# Patient Record
Sex: MALE | Race: WHITE | NOT HISPANIC OR LATINO | Employment: FULL TIME | URBAN - METROPOLITAN AREA
[De-identification: names, ages, dates, MRNs, and addresses within clinical notes are randomized per-mention and may not be internally consistent; named-entity substitution may affect disease eponyms.]

---

## 2022-11-28 ENCOUNTER — OCCUPATIONAL HEALTH (OUTPATIENT)
Dept: URGENT CARE | Facility: CLINIC | Age: 37
End: 2022-11-28

## 2022-11-28 DIAGNOSIS — Z02.1 PRE-EMPLOYMENT EXAMINATION: Primary | ICD-10-CM

## 2022-11-28 PROCEDURE — 72100 X-RAY EXAM L-S SPINE 2/3 VWS: CPT | Mod: TIER,S$GLB,, | Performed by: RADIOLOGY

## 2022-11-28 PROCEDURE — 99499 PHYSICAL, BASIC COMPLEXITY: ICD-10-PCS | Mod: S$GLB,,, | Performed by: NURSE PRACTITIONER

## 2022-11-28 PROCEDURE — 99499 UNLISTED E&M SERVICE: CPT | Mod: S$GLB,,, | Performed by: NURSE PRACTITIONER

## 2022-11-28 PROCEDURE — 72100 XR LUMBAR SPINE 2 OR 3 VIEWS: ICD-10-PCS | Mod: TIER,S$GLB,, | Performed by: RADIOLOGY

## 2024-09-10 ENCOUNTER — HOSPITAL ENCOUNTER (EMERGENCY)
Facility: HOSPITAL | Age: 39
Discharge: ANOTHER HOSPITAL | End: 2024-09-10
Attending: SURGERY

## 2024-09-10 VITALS
DIASTOLIC BLOOD PRESSURE: 75 MMHG | HEIGHT: 65 IN | SYSTOLIC BLOOD PRESSURE: 100 MMHG | WEIGHT: 166.56 LBS | BODY MASS INDEX: 27.75 KG/M2 | HEART RATE: 71 BPM | TEMPERATURE: 99 F | OXYGEN SATURATION: 98 % | RESPIRATION RATE: 18 BRPM

## 2024-09-10 DIAGNOSIS — L03.90 CELLULITIS, UNSPECIFIED CELLULITIS SITE: Primary | ICD-10-CM

## 2024-09-10 DIAGNOSIS — Z87.81 STATUS POST ORIF OF FRACTURE OF ANKLE: ICD-10-CM

## 2024-09-10 DIAGNOSIS — Z98.890 STATUS POST ORIF OF FRACTURE OF ANKLE: ICD-10-CM

## 2024-09-10 PROBLEM — L02.416: Status: ACTIVE | Noted: 2024-09-10

## 2024-09-10 PROBLEM — Z96.9 PRESENCE OF RETAINED HARDWARE: Status: ACTIVE | Noted: 2024-09-10

## 2024-09-10 PROBLEM — F19.10 POLYSUBSTANCE ABUSE: Status: ACTIVE | Noted: 2024-09-10

## 2024-09-10 PROBLEM — L03.116 CELLULITIS OF LEFT ANKLE: Status: ACTIVE | Noted: 2024-09-10

## 2024-09-10 PROBLEM — F10.10 ETOH ABUSE: Status: ACTIVE | Noted: 2024-09-10

## 2024-09-10 LAB
ALBUMIN SERPL BCP-MCNC: 4.1 G/DL (ref 3.5–5.2)
ALP SERPL-CCNC: 62 U/L (ref 55–135)
ALT SERPL W/O P-5'-P-CCNC: 14 U/L (ref 10–44)
ANION GAP SERPL CALC-SCNC: 10 MMOL/L (ref 8–16)
AST SERPL-CCNC: 20 U/L (ref 10–40)
BASOPHILS # BLD AUTO: 0.03 K/UL (ref 0–0.2)
BASOPHILS NFR BLD: 0.3 % (ref 0–1.9)
BILIRUB SERPL-MCNC: 0.6 MG/DL (ref 0.1–1)
BUN SERPL-MCNC: 17 MG/DL (ref 6–20)
CALCIUM SERPL-MCNC: 9.8 MG/DL (ref 8.7–10.5)
CHLORIDE SERPL-SCNC: 103 MMOL/L (ref 95–110)
CO2 SERPL-SCNC: 23 MMOL/L (ref 23–29)
CREAT SERPL-MCNC: 1 MG/DL (ref 0.5–1.4)
CRP SERPL-MCNC: 20.2 MG/L (ref 0–8.2)
DIFFERENTIAL METHOD BLD: ABNORMAL
EOSINOPHIL # BLD AUTO: 0.1 K/UL (ref 0–0.5)
EOSINOPHIL NFR BLD: 1.1 % (ref 0–8)
ERYTHROCYTE [DISTWIDTH] IN BLOOD BY AUTOMATED COUNT: 14.2 % (ref 11.5–14.5)
EST. GFR  (NO RACE VARIABLE): >60 ML/MIN/1.73 M^2
GLUCOSE SERPL-MCNC: 99 MG/DL (ref 70–110)
HCT VFR BLD AUTO: 40.8 % (ref 40–54)
HGB BLD-MCNC: 14.4 G/DL (ref 14–18)
IMM GRANULOCYTES # BLD AUTO: 0.04 K/UL (ref 0–0.04)
IMM GRANULOCYTES NFR BLD AUTO: 0.4 % (ref 0–0.5)
LYMPHOCYTES # BLD AUTO: 2.3 K/UL (ref 1–4.8)
LYMPHOCYTES NFR BLD: 20.3 % (ref 18–48)
MCH RBC QN AUTO: 29.6 PG (ref 27–31)
MCHC RBC AUTO-ENTMCNC: 35.3 G/DL (ref 32–36)
MCV RBC AUTO: 84 FL (ref 82–98)
MONOCYTES # BLD AUTO: 1.5 K/UL (ref 0.3–1)
MONOCYTES NFR BLD: 13.4 % (ref 4–15)
NEUTROPHILS # BLD AUTO: 7.4 K/UL (ref 1.8–7.7)
NEUTROPHILS NFR BLD: 64.5 % (ref 38–73)
NRBC BLD-RTO: 0 /100 WBC
PLATELET # BLD AUTO: 302 K/UL (ref 150–450)
PMV BLD AUTO: 8.4 FL (ref 9.2–12.9)
POTASSIUM SERPL-SCNC: 4.4 MMOL/L (ref 3.5–5.1)
PROT SERPL-MCNC: 7.6 G/DL (ref 6–8.4)
RBC # BLD AUTO: 4.86 M/UL (ref 4.6–6.2)
SODIUM SERPL-SCNC: 136 MMOL/L (ref 136–145)
WBC # BLD AUTO: 11.39 K/UL (ref 3.9–12.7)

## 2024-09-10 PROCEDURE — 85025 COMPLETE CBC W/AUTO DIFF WBC: CPT

## 2024-09-10 PROCEDURE — 25500020 PHARM REV CODE 255

## 2024-09-10 PROCEDURE — 86140 C-REACTIVE PROTEIN: CPT

## 2024-09-10 PROCEDURE — 25000003 PHARM REV CODE 250

## 2024-09-10 PROCEDURE — 96372 THER/PROPH/DIAG INJ SC/IM: CPT

## 2024-09-10 PROCEDURE — 99285 EMERGENCY DEPT VISIT HI MDM: CPT | Mod: 25

## 2024-09-10 PROCEDURE — 63600175 PHARM REV CODE 636 W HCPCS

## 2024-09-10 PROCEDURE — 80053 COMPREHEN METABOLIC PANEL: CPT

## 2024-09-10 RX ORDER — KETOROLAC TROMETHAMINE 30 MG/ML
30 INJECTION, SOLUTION INTRAMUSCULAR; INTRAVENOUS
Status: COMPLETED | OUTPATIENT
Start: 2024-09-10 | End: 2024-09-10

## 2024-09-10 RX ORDER — HYDROCODONE BITARTRATE AND ACETAMINOPHEN 5; 325 MG/1; MG/1
1 TABLET ORAL
Status: COMPLETED | OUTPATIENT
Start: 2024-09-10 | End: 2024-09-10

## 2024-09-10 RX ORDER — VANCOMYCIN 2 GRAM/500 ML IN 0.9 % SODIUM CHLORIDE INTRAVENOUS
2000
Status: DISCONTINUED | OUTPATIENT
Start: 2024-09-10 | End: 2024-09-10 | Stop reason: HOSPADM

## 2024-09-10 RX ADMIN — Medication 2000 MG: at 11:09

## 2024-09-10 RX ADMIN — HYDROCODONE BITARTRATE AND ACETAMINOPHEN 1 TABLET: 5; 325 TABLET ORAL at 06:09

## 2024-09-10 RX ADMIN — IOHEXOL 75 ML: 350 INJECTION, SOLUTION INTRAVENOUS at 07:09

## 2024-09-10 RX ADMIN — KETOROLAC TROMETHAMINE 30 MG: 30 INJECTION, SOLUTION INTRAMUSCULAR; INTRAVENOUS at 06:09

## 2024-09-10 NOTE — PROGRESS NOTES
"Pharmacokinetic Initial Assessment: IV Vancomycin    Assessment/Plan:    Initiate intravenous vancomycin with loading dose of 2000 mg once followed by a maintenance dose of vancomycin 1000mg IV every 12 hours  Desired empiric serum trough concentration is 10 to 15 mcg/mL  Draw vancomycin trough level 60 min prior to fourth dose on 9/11 at approximately 2200  Pharmacy will continue to follow and monitor vancomycin.      Please contact pharmacy at extension [phone] with any questions regarding this assessment.     Thank you for the consult,   Ale Sumner       Patient brief summary:  Peter Car is a 38 y.o. male initiated on antimicrobial therapy with IV Vancomycin for treatment of suspected skin & soft tissue infection    Drug Allergies:   Review of patient's allergies indicates:   Allergen Reactions    Wasp venom        Actual Body Weight:   75.5kg    Renal Function:   Estimated Creatinine Clearance: 93.2 mL/min (based on SCr of 1 mg/dL).,     Dialysis Method (if applicable):  N/A    CBC (last 72 hours):  Recent Labs   Lab Result Units 09/10/24  0631   WBC K/uL 11.39   Hemoglobin g/dL 14.4   Hematocrit % 40.8   Platelets K/uL 302   Gran % % 64.5   Lymph % % 20.3   Mono % % 13.4   Eosinophil % % 1.1   Basophil % % 0.3   Differential Method  Automated       Metabolic Panel (last 72 hours):  Recent Labs   Lab Result Units 09/10/24  0631   Sodium mmol/L 136   Potassium mmol/L 4.4   Chloride mmol/L 103   CO2 mmol/L 23   Glucose mg/dL 99   BUN mg/dL 17   Creatinine mg/dL 1.0   Albumin g/dL 4.1   Total Bilirubin mg/dL 0.6   Alkaline Phosphatase U/L 62   AST U/L 20   ALT U/L 14       Drug levels (last 3 results):  No results for input(s): "VANCOMYCINRA", "VANCORANDOM", "VANCOMYCINPE", "VANCOPEAK", "VANCOMYCINTR", "VANCOTROUGH" in the last 72 hours.    Microbiologic Results:  Microbiology Results (last 7 days)       ** No results found for the last 168 hours. **            "

## 2024-09-10 NOTE — ED PROVIDER NOTES
"Encounter Date: 9/10/2024    Source of History:   Patient and medical record, without language barrier or      Chief complaint:  Ankle Pain (Patient reports getting off of work yesterday and noticed increased swelling and redness to left ankle.  Denies injury or trauma.  No fever.  No drainage from site.)    HPI:  Peter Car is a 38 y.o. male with no significant medical history presenting with chief complaint of left ankle pain and swelling.  Patient states that after getting off of work yesterday he noticed swelling, redness, pain to the medial malleolus of his left ankle.  He denies any trauma but has a small scab over the medial malleolus which he states is from his work boots.  He denies any trauma or fevers at this time.  Patient states he has been unable to bear weight due to the pain.  He had an ORIF done on this ankle for a fracture when he was 13 years old.    This is the extent to the patients complaints today here in the emergency department.    ROS: A review of systems was conducted with pertinent positive and negative findings documented in HPI with all other systems reviewed and negative.  ROS    Review of patient's allergies indicates:   Allergen Reactions    Wasp venom        PMH:  As per HPI and below:  History reviewed. No pertinent past medical history.  Past Surgical History:   Procedure Laterality Date    ANKLE SURGERY Left      Social History     Socioeconomic History    Marital status: Single   Tobacco Use    Smoking status: Every Day     Current packs/day: 1.00     Types: Cigarettes     Passive exposure: Past    Smokeless tobacco: Never    Tobacco comments:     Quit smoking 5 days ago.   Substance and Sexual Activity    Alcohol use: Yes     Alcohol/week: 12.0 standard drinks of alcohol     Types: 12 Cans of beer per week     Vitals:    /72   Pulse 77   Temp 98.5 °F (36.9 °C) (Oral)   Resp 18   Ht 5' 5" (1.651 m)   Wt 75.5 kg (166 lb 8.9 oz)   SpO2 99%   BMI 27.72 " kg/m²     Physical Exam  Vitals and nursing note reviewed.   Constitutional:       General: He is not in acute distress.     Appearance: Normal appearance. He is not toxic-appearing or diaphoretic.   HENT:      Head: Normocephalic and atraumatic.      Right Ear: External ear normal.      Left Ear: External ear normal.   Eyes:      General: No scleral icterus.     Conjunctiva/sclera: Conjunctivae normal.   Cardiovascular:      Rate and Rhythm: Normal rate and regular rhythm.   Pulmonary:      Effort: Pulmonary effort is normal. No respiratory distress.      Breath sounds: No stridor.   Abdominal:      General: Abdomen is flat. There is no distension.   Musculoskeletal:         General: No swelling.      Cervical back: Normal range of motion and neck supple.      Comments:    LLE:  No deformity, small abrasion/scab over medial malleolus  Swelling, redness, tenderness to palpation over medial malleolus  Compartments soft  Pain with passive range of motion  SILT   Motor intact   2+ DP/PT      Skin:     General: Skin is dry.      Coloration: Skin is not jaundiced.   Neurological:      Mental Status: He is alert and oriented to person, place, and time. Mental status is at baseline.   Psychiatric:         Mood and Affect: Mood normal.         Behavior: Behavior normal.       Procedures    Laboratory Studies:  Labs that have been ordered have been independently reviewed and interpreted by myself.  Labs Reviewed   CBC W/ AUTO DIFFERENTIAL - Abnormal       Result Value    WBC 11.39      RBC 4.86      Hemoglobin 14.4      Hematocrit 40.8      MCV 84      MCH 29.6      MCHC 35.3      RDW 14.2      Platelets 302      MPV 8.4 (*)     Immature Granulocytes 0.4      Gran # (ANC) 7.4      Immature Grans (Abs) 0.04      Lymph # 2.3      Mono # 1.5 (*)     Eos # 0.1      Baso # 0.03      nRBC 0      Gran % 64.5      Lymph % 20.3      Mono % 13.4      Eosinophil % 1.1      Basophil % 0.3      Differential Method Automated      COMPREHENSIVE METABOLIC PANEL    Sodium 136      Potassium 4.4      Chloride 103      CO2 23      Glucose 99      BUN 17      Creatinine 1.0      Calcium 9.8      Total Protein 7.6      Albumin 4.1      Total Bilirubin 0.6      Alkaline Phosphatase 62      AST 20      ALT 14      eGFR >60      Anion Gap 10     C-REACTIVE PROTEIN     Imaging Results              CT Ankle (Including Hindfoot) With Contrast Left (Final result)  Result time 09/10/24 08:33:20      Final result by Broderick Camacho MD (09/10/24 08:33:20)                   Impression:      Status post ORIF of the left ankle.  Severe medial malleolar soft tissue swelling likely representing cellulitis, with accompanying phlegmon/early abscess formation.  No evidence for osteomyelitis.  MRI would be more sensitive for osteomyelitis if clinically warranted.      Electronically signed by: Broderick Camacho MD  Date:    09/10/2024  Time:    08:33               Narrative:    EXAMINATION:  CT ANKLE (INCLUDING HINDFOOT) WITH CONTRAST LEFT    CLINICAL HISTORY:  Soft tissue infection suspected, ankle, xray done;left medial malleolus cellulitis with underlying hardware, eval for osteo and abscess;    TECHNIQUE:  Axial images through the left ankle were acquired following intravenous administration of 75 cc Omnipaque 350.  Multiplanar reformatted images were created    COMPARISON:  None    FINDINGS:  There has been ORIF of the left ankle with a lateral plate screw device and 3 screws of the medial malleolus with accompanying cerclage wire.  Hardware components appear intact and engaged with good alignment present.  There is no acute fracture or dislocation.  There are no osseous erosive changes.    There is severe subcutaneous fat stranding and fluid density centered along the medial aspect of the ankle.  A poorly formed fluid collection is present over the medial aspect of the medial malleolus, measuring 2.3 x 2.1 x 0.7 cm.  There is no soft tissue gas.  There  is no tibiotalar joint effusion.                                       X-Ray Ankle Complete Left (Final result)  Result time 09/10/24 08:28:11      Final result by Broderick Camacho MD (09/10/24 08:28:11)                   Impression:      Status post ORIF the left ankle with intact hardware in good alignment.  Severe medial soft tissue swelling.      Electronically signed by: Broderick Camacho MD  Date:    09/10/2024  Time:    08:28               Narrative:    EXAMINATION:  XR ANKLE COMPLETE 3 VIEW LEFT    CLINICAL HISTORY:  Exposure to other specified factors, initial encounter    TECHNIQUE:  AP, lateral and oblique views of the left ankle were performed.    COMPARISON:  None    FINDINGS:  There has been ORIF of the left ankle with a lateral fibular plate screw device as well as 3 screws of the medial tibia with accompanying cerclage wire.  The hardware components are intact and engaged with good alignment present there is no acute fracture.  There is severe medial soft tissue swelling.  No osseous erosive changes.                                    Imaging (independently interpreted by me): Left Ankle X-Ray:  ORIF, no fractures or dislocations    I decided to obtain the patient's medical records.  Summary of Medical Records:    Medications   vancomycin 2 g in 0.9% sodium chloride 500 mL IVPB (has no administration in time range)   HYDROcodone-acetaminophen 5-325 mg per tablet 1 tablet (1 tablet Oral Given 9/10/24 0617)   ketorolac injection 30 mg (30 mg Intramuscular Given 9/10/24 0617)   iohexoL (OMNIPAQUE 350) injection 75 mL (75 mLs Intravenous Given 9/10/24 0727)     MDM:    38 y.o. male with ankle pain    Differential Dx:  Differential includes but is not limited to cellulitis, abscess, infected hardware, doubt fracture    ED Management:  Basic labs as well as x-ray is ordered.  Patient's cellulitis is overlying his surgical incision from his ORIF done many years ago.  However I am concerned that he may  have a deeper infection and could have an infection of his ORIF hardware.  CBC without leukocytosis.  X-ray showing soft tissue swelling.  CT ordered and demonstrating cellulitis with possible abscess/phlegmon developing.  Vancomycin ordered.  Patient needs transfer for orthopedic evaluation at this time.  Patient accepted at [facility] as ED to ED transfer    Medical Decision Making  Amount and/or Complexity of Data Reviewed  Labs: ordered.  Radiology: ordered.    Risk  Prescription drug management.            Diagnostic Impression:    Final diagnoses:  [L03.90] Cellulitis, unspecified cellulitis site (Primary)  [Z98.890, Z87.81] Status post ORIF of fracture of ankle     ED Disposition Condition    Transfer to Another Facility Stable                   Osmani Alejandro MD  09/10/24 1030       Osmani Alejandro MD  09/10/24 1031

## 2024-09-12 NOTE — PROGRESS NOTES
Pharmacokinetic Assessment Follow Up: IV Vancomycin    Vancomycin serum concentration assessment(s):    The trough level was drawn correctly and can be used to guide therapy at this time. The measurement is below the desired definitive target range of 10 to 20 mcg/mL.    Vancomycin Regimen Plan:    Change regimen to Vancomycin 1500 mg IV every 12 hours with next serum trough concentration measured at 1100 prior to 1200 dose on 9/13.    Drug levels (last 3 results):  Recent Labs   Lab Result Units 09/11/24  2222   Vancomycin-Trough ug/mL 6.3*       Pharmacy will continue to follow and monitor vancomycin.    Please contact pharmacy at extension [phone] for questions regarding this assessment.    Thank you for the consult,   Trung Jennings       Patient brief summary:  Peter Car is a 38 y.o. male initiated on antimicrobial therapy with IV Vancomycin for treatment of skin & soft tissue infection    The patient's current regimen is vancomycin 1500 mg Q12.    Drug Allergies:   Review of patient's allergies indicates:   Allergen Reactions    Wasp venom        Actual Body Weight:   75.5kg    Renal Function:   Estimated Creatinine Clearance: 107.1 mL/min (based on SCr of 0.87 mg/dL).,     Dialysis Method (if applicable):  N/A    CBC (last 72 hours):  Recent Labs   Lab Result Units 09/10/24  0631 09/11/24  0459   WBC K/uL 11.39 13.80*   Hemoglobin g/dL 14.4 12.0*   Hematocrit % 40.8 36.1*   Platelets K/uL 302 269   Gran % % 64.5 76.8*   Lymph % % 20.3 12.3*   Mono % % 13.4 10.4   Eosinophil % % 1.1 0.0   Basophil % % 0.3 0.5   Differential Method  Automated Automated       Metabolic Panel (last 72 hours):  Recent Labs   Lab Result Units 09/10/24  0631 09/11/24  0459   Sodium mmol/L 136 135*   Potassium mmol/L 4.4 4.2   Chloride mmol/L 103 103   CO2 mmol/L 23 25   Glucose mg/dL 99 128*   BUN mg/dL 17 17   Creatinine mg/dL 1.0 0.87  0.87   Albumin g/dL 4.1 3.9   Total Bilirubin mg/dL 0.6 0.7   Alkaline Phosphatase U/L 62  59   AST U/L 20 29   ALT U/L 14 17       Vancomycin Administrations:  vancomycin given in the last 96 hours                     vancomycin (VANCOCIN) 1000 mg in dextrose 5 % 200 mL IVPB (mg) 1,000 mg New Bag 09/11/24 1014     1,000 mg New Bag 09/10/24 2321    vancomycin (VANCOCIN) 1,500 mg in D5W 250 mL IVPB (mg) 1,500 mg New Bag 09/10/24 1302    vancomycin 2 g in 0.9% sodium chloride 500 mL IVPB (mg) 2,000 mg New Bag 09/10/24 1106                    Microbiologic Results:  Microbiology Results (last 7 days)       ** No results found for the last 168 hours. **